# Patient Record
Sex: MALE | Race: WHITE | NOT HISPANIC OR LATINO | URBAN - METROPOLITAN AREA
[De-identification: names, ages, dates, MRNs, and addresses within clinical notes are randomized per-mention and may not be internally consistent; named-entity substitution may affect disease eponyms.]

---

## 2022-11-10 ENCOUNTER — OFFICE VISIT (OUTPATIENT)
Dept: URBAN - METROPOLITAN AREA CLINIC 35 | Facility: CLINIC | Age: 72
End: 2022-11-10

## 2022-12-13 ENCOUNTER — OFFICE VISIT (OUTPATIENT)
Dept: URBAN - METROPOLITAN AREA CLINIC 35 | Facility: CLINIC | Age: 72
End: 2022-12-13

## 2023-01-12 ENCOUNTER — OFFICE VISIT (OUTPATIENT)
Dept: URBAN - METROPOLITAN AREA CLINIC 35 | Facility: CLINIC | Age: 73
End: 2023-01-12

## 2023-01-12 ENCOUNTER — TELEPHONE ENCOUNTER (OUTPATIENT)
Dept: URBAN - METROPOLITAN AREA CLINIC 33 | Facility: CLINIC | Age: 73
End: 2023-01-12

## 2023-01-12 NOTE — HPI-COLONOSCOPY SCREENING
72 y/o male patient presents today for a colorectal cancer screening. Patient admits/denies this will be his first colonoscopy. He admits/denies a family history of colon, gastric, or esophageal cancer/polyps. Currently reports - - bowel movements - - with/out strain. His stools are - - with/out blood, mucus, or melena. He admits/denies any episodes of rectal pain or pruritus ani.

## 2023-07-05 ENCOUNTER — OFFICE VISIT (OUTPATIENT)
Dept: URBAN - METROPOLITAN AREA CLINIC 33 | Facility: CLINIC | Age: 73
End: 2023-07-05

## 2023-07-05 ENCOUNTER — TELEPHONE ENCOUNTER (OUTPATIENT)
Dept: URBAN - METROPOLITAN AREA CLINIC 33 | Facility: CLINIC | Age: 73
End: 2023-07-05

## 2023-07-05 NOTE — HPI-ACID REFLUX
73 year old male patient admits long standing history/recent onset of heartburn/reflux. Symptoms include/are described as -- and is located --. He/She notes reflux episodes will occur every --, with each episode lasting --. He/She admits having tried -- with -- relief. He admits current use of -- with -- relief. Denies/admits dysphagia, globus, sour eructations, bloating/gas, indigestion, early satiety, changes in appetite, coughing, abdominal/epigastric pain, or changes in bowel habits. He admits/denies completing an EGD in the past.

## 2023-07-05 NOTE — HPI-COLONOSCOPY SCREENING
74 y/o male patient presents today for a colorectal cancer screening. Patient admits/denies this will be his first colonoscopy. He admits/denies a family history of colon, gastric, or esophageal cancer/polyps. Currently reports - - bowel movements - - with/out strain. His stools are - - with/out blood, mucus, or melena. He admits/denies any episodes of rectal pain or pruritus ani.

## 2023-07-26 ENCOUNTER — CLAIMS CREATED FROM THE CLAIM WINDOW (OUTPATIENT)
Dept: URBAN - METROPOLITAN AREA CLINIC 33 | Facility: CLINIC | Age: 73
End: 2023-07-26
Payer: MEDICARE

## 2023-07-26 VITALS
BODY MASS INDEX: 22.62 KG/M2 | DIASTOLIC BLOOD PRESSURE: 78 MMHG | SYSTOLIC BLOOD PRESSURE: 148 MMHG | HEIGHT: 72 IN | WEIGHT: 167 LBS

## 2023-07-26 DIAGNOSIS — R63.0 LOSS OF APPETITE: ICD-10-CM

## 2023-07-26 DIAGNOSIS — Z12.11 ENCOUNTER FOR SCREENING FOR MALIGNANT NEOPLASM OF COLON: ICD-10-CM

## 2023-07-26 DIAGNOSIS — R12 HEARTBURN: ICD-10-CM

## 2023-07-26 DIAGNOSIS — R63.4 WEIGHT LOSS: ICD-10-CM

## 2023-07-26 PROBLEM — 79890006: Status: ACTIVE | Noted: 2023-07-26

## 2023-07-26 PROBLEM — 73595000: Status: ACTIVE | Noted: 2023-07-26

## 2023-07-26 PROCEDURE — 99204 OFFICE O/P NEW MOD 45 MIN: CPT | Performed by: PHYSICIAN ASSISTANT

## 2023-07-26 RX ORDER — IBUPROFEN 800 MG/1
TAKE ONE TABLET BY MOUTH EVERY 6 TO 8 HOURS AS NEEDED FOR PAIN TABLET, FILM COATED ORAL
Qty: 20 UNSPECIFIED | Refills: 0 | Status: ACTIVE | COMMUNITY

## 2023-07-26 RX ORDER — TAMSULOSIN HYDROCHLORIDE 0.4 MG/1
TAKE ONE CAPSULE BY MOUTH ONE TIME DAILY CAPSULE ORAL
Qty: 90 UNSPECIFIED | Refills: 0 | Status: ACTIVE | COMMUNITY

## 2023-07-26 RX ORDER — FENOFIBRATE 54 MG/1
TAKE ONE TABLET BY MOUTH ONE TIME DAILY TABLET ORAL
Qty: 90 UNSPECIFIED | Refills: 0 | Status: ACTIVE | COMMUNITY

## 2023-07-26 RX ORDER — ALPRAZOLAM 0.5 MG/1
1 TABLET TABLET ORAL
Qty: 15 | Status: ACTIVE | COMMUNITY
Start: 2015-01-30

## 2023-07-26 RX ORDER — BUSPIRONE HYDROCHLORIDE 10 MG/1
TABLET ORAL
Qty: 90 TABLET | Status: ACTIVE | COMMUNITY

## 2023-07-26 RX ORDER — LISINOPRIL 20 MG/1
TAKE ONE TABLET BY MOUTH ONE TIME DAILY TABLET ORAL
Qty: 90 UNSPECIFIED | Refills: 0 | Status: ACTIVE | COMMUNITY

## 2023-07-26 RX ORDER — SODIUM, POTASSIUM,MAG SULFATES 17.5-3.13G
AS DIRECTED SOLUTION, RECONSTITUTED, ORAL ORAL
Qty: 1 | Refills: 0 | OUTPATIENT
Start: 2023-07-26 | End: 2023-07-28

## 2023-07-26 RX ORDER — FAMOTIDINE 10 MG/1
1 TABLET AS NEEDED TABLET, FILM COATED ORAL TWICE A DAY
Status: DISCONTINUED | COMMUNITY
Start: 2015-01-30

## 2023-07-26 RX ORDER — DULOXETINE HYDROCHLORIDE 30 MG/1
TAKE ONE CAPSULE BY MOUTH ONE TIME DAILY CAPSULE, DELAYED RELEASE PELLETS ORAL
Qty: 90 UNSPECIFIED | Refills: 0 | Status: ACTIVE | COMMUNITY

## 2023-07-26 RX ORDER — ALPRAZOLAM 0.25 MG/1
TAKE ONE TABLET BY MOUTH TWICE A DAY AS NEEDED TABLET ORAL
Qty: 45 UNSPECIFIED | Refills: 0 | Status: ACTIVE | COMMUNITY

## 2023-07-26 RX ORDER — PANTOPRAZOLE SODIUM 40 MG/1
TAKE ONE TABLET BY MOUTH ONE TIME DAILY TABLET, DELAYED RELEASE ORAL
Qty: 30 UNSPECIFIED | Refills: 0 | Status: DISCONTINUED | COMMUNITY

## 2023-07-26 RX ORDER — ATORVASTATIN CALCIUM 20 MG/1
TAKE ONE TABLET BY MOUTH ONE TIME DAILY TABLET, FILM COATED ORAL
Qty: 90 UNSPECIFIED | Refills: 0 | Status: ACTIVE | COMMUNITY

## 2023-07-26 NOTE — HPI-COLORECTAL CANCER SCREENING
Pt last had a colonoscopy back when he was early 50s, with no polyps found.  He has BMs daily, no blood seen, no diarrhea.

## 2023-07-26 NOTE — HPI-ABDOMINAL PAIN
73 year old male patient presents today for abdominal cramping in his upper abdomen. Cramping has been present since his wife was diagnosed with cancer and he describes it as a nervous sensation. He also states he gets abdominal burning when he eats spicy foods. He admits to a lot of stress. He admits the sensation in his stomach is intermittent and is not always present. He describes the sensation as a shaking vibration.  He states he can sometimes have acid reflux.  He denies dysphagia, N/V, early satiety, however has loss of appetite.  Lost 10 pounds in last 6 months.  Never had an EGD that he can recall.

## 2023-07-31 ENCOUNTER — TELEPHONE ENCOUNTER (OUTPATIENT)
Dept: URBAN - METROPOLITAN AREA CLINIC 35 | Facility: CLINIC | Age: 73
End: 2023-07-31

## 2023-07-31 RX ORDER — OMEPRAZOLE 20 MG/1
1 CAPSULE 30 MINUTES BEFORE MORNING MEAL CAPSULE, DELAYED RELEASE ORAL ONCE A DAY
Qty: 30 | Refills: 3 | OUTPATIENT
Start: 2023-07-31

## 2023-09-11 ENCOUNTER — OFFICE VISIT (OUTPATIENT)
Dept: URBAN - METROPOLITAN AREA MEDICAL CENTER 27 | Facility: MEDICAL CENTER | Age: 73
End: 2023-09-11

## 2023-10-04 ENCOUNTER — OFFICE VISIT (OUTPATIENT)
Dept: URBAN - METROPOLITAN AREA CLINIC 33 | Facility: CLINIC | Age: 73
End: 2023-10-04
Payer: MEDICARE

## 2023-10-04 ENCOUNTER — DASHBOARD ENCOUNTERS (OUTPATIENT)
Age: 73
End: 2023-10-04

## 2023-10-04 VITALS
SYSTOLIC BLOOD PRESSURE: 142 MMHG | BODY MASS INDEX: 23.03 KG/M2 | DIASTOLIC BLOOD PRESSURE: 80 MMHG | HEIGHT: 72 IN | WEIGHT: 170 LBS

## 2023-10-04 DIAGNOSIS — R63.0 LOSS OF APPETITE: ICD-10-CM

## 2023-10-04 DIAGNOSIS — Z12.11 ENCOUNTER FOR SCREENING FOR MALIGNANT NEOPLASM OF COLON: ICD-10-CM

## 2023-10-04 DIAGNOSIS — R12 HEARTBURN: ICD-10-CM

## 2023-10-04 PROCEDURE — 99214 OFFICE O/P EST MOD 30 MIN: CPT | Performed by: PHYSICIAN ASSISTANT

## 2023-10-04 RX ORDER — SODIUM, POTASSIUM,MAG SULFATES 17.5-3.13G
AS DIRECTED SOLUTION, RECONSTITUTED, ORAL ORAL
OUTPATIENT
Start: 2023-10-04

## 2023-10-04 RX ORDER — FENOFIBRATE 54 MG/1
TAKE ONE TABLET BY MOUTH ONE TIME DAILY TABLET ORAL
Qty: 90 UNSPECIFIED | Refills: 0 | Status: ACTIVE | COMMUNITY

## 2023-10-04 RX ORDER — ALPRAZOLAM 0.25 MG/1
TAKE ONE TABLET BY MOUTH TWICE A DAY AS NEEDED TABLET ORAL
Qty: 45 UNSPECIFIED | Refills: 0 | Status: ACTIVE | COMMUNITY

## 2023-10-04 RX ORDER — IBUPROFEN 800 MG/1
TAKE ONE TABLET BY MOUTH EVERY 6 TO 8 HOURS AS NEEDED FOR PAIN TABLET, FILM COATED ORAL
Qty: 20 UNSPECIFIED | Refills: 0 | Status: ACTIVE | COMMUNITY

## 2023-10-04 RX ORDER — TAMSULOSIN HYDROCHLORIDE 0.4 MG/1
TAKE ONE CAPSULE BY MOUTH ONE TIME DAILY CAPSULE ORAL
Qty: 90 UNSPECIFIED | Refills: 0 | Status: ACTIVE | COMMUNITY

## 2023-10-04 RX ORDER — DULOXETINE HYDROCHLORIDE 30 MG/1
TAKE ONE CAPSULE BY MOUTH ONE TIME DAILY CAPSULE, DELAYED RELEASE PELLETS ORAL
Qty: 90 UNSPECIFIED | Refills: 0 | Status: ACTIVE | COMMUNITY

## 2023-10-04 RX ORDER — ALPRAZOLAM 0.5 MG/1
1 TABLET TABLET ORAL
Qty: 15 | Status: ACTIVE | COMMUNITY
Start: 2015-01-30

## 2023-10-04 RX ORDER — LISINOPRIL 20 MG/1
TAKE ONE TABLET BY MOUTH ONE TIME DAILY TABLET ORAL
Qty: 90 UNSPECIFIED | Refills: 0 | Status: ACTIVE | COMMUNITY

## 2023-10-04 RX ORDER — BUSPIRONE HYDROCHLORIDE 10 MG/1
TABLET ORAL
Qty: 90 TABLET | Status: ACTIVE | COMMUNITY

## 2023-10-04 RX ORDER — OMEPRAZOLE 20 MG/1
1 CAPSULE 30 MINUTES BEFORE MORNING MEAL CAPSULE, DELAYED RELEASE ORAL ONCE A DAY
Qty: 30 | OUTPATIENT
Start: 2023-10-04

## 2023-10-04 RX ORDER — ATORVASTATIN CALCIUM 20 MG/1
TAKE ONE TABLET BY MOUTH ONE TIME DAILY TABLET, FILM COATED ORAL
Qty: 90 UNSPECIFIED | Refills: 0 | Status: ACTIVE | COMMUNITY

## 2023-10-04 RX ORDER — OMEPRAZOLE 20 MG/1
1 CAPSULE 30 MINUTES BEFORE MORNING MEAL CAPSULE, DELAYED RELEASE ORAL ONCE A DAY
Qty: 30 | Refills: 3 | Status: ACTIVE | COMMUNITY
Start: 2023-07-31

## 2023-10-04 NOTE — HPI-COLORECTAL CANCER SCREENING
Pt last had a colonoscopy back when he was early 50s, with no polyps found.  He has BMs daily, no blood seen, no diarrhea.  No fhx of colon cancer or polyps.

## 2023-10-04 NOTE — HPI-HEARTBURN
His concern is heartburn.  He denies heartburn at the moment, but has had heartburn in the past with spicy foods.  He is having acid reflux at times.  He has an EGD scheduled next month.  He will take Pepto Bismol with some relief of symptoms.  No dysphagia, N/V, early satiety.  He has had 10 pound weight loss, and admits to a lot of stress in past six months but has put on about 5 of that weighit back on.  Last visit: 73 year old male patient presents today for abdominal cramping in his upper abdomen. Cramping has been present since his wife was diagnosed with cancer and he describes it as a nervous sensation. He also states he gets abdominal burning when he eats spicy foods. He admits to a lot of stress. He admits the sensation in his stomach is intermittent and is not always present. He describes the sensation as a shaking vibration.  He states he can sometimes have acid reflux.  He denies dysphagia, N/V, early satiety, however has loss of appetite.  Lost 10 pounds in last 6 months.  Never had an EGD that he can recall.

## 2023-11-08 ENCOUNTER — OFFICE VISIT (OUTPATIENT)
Dept: URBAN - METROPOLITAN AREA CLINIC 33 | Facility: CLINIC | Age: 73
End: 2023-11-08

## 2023-11-21 ENCOUNTER — TELEPHONE ENCOUNTER (OUTPATIENT)
Dept: URBAN - METROPOLITAN AREA CLINIC 33 | Facility: CLINIC | Age: 73
End: 2023-11-21

## 2023-11-27 ENCOUNTER — OFFICE VISIT (OUTPATIENT)
Dept: URBAN - METROPOLITAN AREA MEDICAL CENTER 27 | Facility: MEDICAL CENTER | Age: 73
End: 2023-11-27
Payer: MEDICARE

## 2023-11-27 DIAGNOSIS — K22.89 OTHER SPECIFIED DISEASE OF ESOPHAGUS: ICD-10-CM

## 2023-11-27 DIAGNOSIS — B96.81 BACTERIAL INFECTION DUE TO H. PYLORI: ICD-10-CM

## 2023-11-27 DIAGNOSIS — Z12.11 COLON CANCER SCREENING: ICD-10-CM

## 2023-11-27 DIAGNOSIS — D12.0 ADENOMA OF CECUM: ICD-10-CM

## 2023-11-27 DIAGNOSIS — K26.9 DU (DUODENAL ULCER): ICD-10-CM

## 2023-11-27 DIAGNOSIS — K29.60 ADENOPAPILLOMATOSIS GASTRICA: ICD-10-CM

## 2023-11-27 PROCEDURE — 43239 EGD BIOPSY SINGLE/MULTIPLE: CPT | Performed by: INTERNAL MEDICINE

## 2023-11-27 PROCEDURE — 45385 COLONOSCOPY W/LESION REMOVAL: CPT | Performed by: INTERNAL MEDICINE

## 2023-12-20 ENCOUNTER — OFFICE VISIT (OUTPATIENT)
Dept: URBAN - METROPOLITAN AREA CLINIC 33 | Facility: CLINIC | Age: 73
End: 2023-12-20